# Patient Record
Sex: FEMALE | Race: WHITE | NOT HISPANIC OR LATINO | Employment: OTHER | URBAN - METROPOLITAN AREA
[De-identification: names, ages, dates, MRNs, and addresses within clinical notes are randomized per-mention and may not be internally consistent; named-entity substitution may affect disease eponyms.]

---

## 2023-08-15 ENCOUNTER — EVALUATION (OUTPATIENT)
Dept: PHYSICAL THERAPY | Facility: CLINIC | Age: 53
End: 2023-08-15
Payer: COMMERCIAL

## 2023-08-15 DIAGNOSIS — M72.2 PLANTAR FASCIITIS OF LEFT FOOT: Primary | ICD-10-CM

## 2023-08-15 PROCEDURE — 97161 PT EVAL LOW COMPLEX 20 MIN: CPT | Performed by: PHYSICAL THERAPIST

## 2023-08-15 RX ORDER — ESTRADIOL 0.1 MG/G
2 CREAM VAGINAL DAILY
COMMUNITY

## 2023-08-15 RX ORDER — DUTASTERIDE 0.5 MG/1
0.5 CAPSULE, LIQUID FILLED ORAL DAILY
COMMUNITY

## 2023-08-15 RX ORDER — ESTRADIOL 0.07 MG/D
1 FILM, EXTENDED RELEASE TRANSDERMAL 2 TIMES WEEKLY
COMMUNITY

## 2023-08-15 NOTE — LETTER
2023    Jayy ChocoAndre [facility]    Patient: Trish Curiel   YOB: 1970   Date of Visit: 8/15/2023     Encounter Diagnosis     ICD-10-CM    1. Plantar fasciitis of left foot  M72.2           Dear Dr. Gastelum Mom: Thank you for your recent referral of Preethi Carpenter. Please review the attached evaluation summary from Preethi's recent visit. Please verify that you agree with the plan of care by signing the attached order. If you have any questions or concerns, please do not hesitate to call. I sincerely appreciate the opportunity to share in the care of one of your patients and hope to have another opportunity to work with you in the near future. Sincerely,    Marcia Pedro, PT      Referring Provider:      I certify that I have read the below Plan of Care and certify the need for these services furnished under this plan of treatment while under my care. Jayy ChocoAndre [facility]  Via Fax: [phone]          PT Evaluation     Today's date: 8/15/2023  Patient name: Trish uCriel  : 1970  MRN: [id number]  Referring provider: Jayy Wilhelm DPM  Dx:   Encounter Diagnosis     ICD-10-CM    1. Plantar fasciitis of left foot  M72.2                      Assessment  Assessment details: Patient is a 46 y.o. female who  presents with L foot pain  associated with a diagnosis of plantar fasciitis. She has functional limitations as a result of impairments. Patient would benefit from course of skilled physical therapy to address above listed impairments in an effort to improve function. Understanding of Dx/Px/POC: excellent  Goals  Short Term Goals:  1) Pain : Decrease L foot    pain to 2/10 at worst x 1 continuous week within 2-3 weeks.   2) Function: Improved FOTO score from IE within 2-3 weeks (58@ IE) patient to note greater ease of ambulation within 2-3 weeks    LongTerm Goals:  1) Pain : Elimiminate L foot  pain  x 1 continuous week within 4-6 weeks. 2) Function: Improved FOTO score to at least 70 ; no reported difficulty with ADLs as they pertain to foot within 4-6 weeks. 3) Independent  with HEP within 4-6 weeks. Plan  Patient would benefit from: skilled physical therapy  Planned modality interventions: TENS, thermotherapy: hydrocollator packs, cryotherapy, ultrasound and low level laser therapy (prn)  Planned therapy interventions: home exercise program, stretching, strengthening, manual therapy and joint mobilization  Frequency: 2x/wk x 4-6 weeks. Treatment plan discussed with: patient        Subjective Evaluation    History of Present Illness  Mechanism of injury: Patient is a 46 y.o. old female who presents for an initial outpatient physical therapy consultation regarding her L foot pain. Bilateral foot pain  set in insidiously 2023. Had received steroidal injectons in the past with good relief, most recently on 23. Recent flare of L sided pain , now referred for course of therapy. Pain occurs pervasively from first step out of bed in the AM, tends to increase as with more time spent on feet. Patient Goals  Patient goals for therapy: decreased pain, independence with ADLs/IADLs and return to sport/leisure activities    Pain  At worst pain ratin  Quality: "like fire"  Aggravating factors: standing and walking    Social Support    Working: retired. Objective     Tenderness   Left Ankle/Foot   Tenderness in the plantar fascia. Active Range of Motion   Left Ankle/Foot   Normal active range of motion    Right Ankle/Foot   Normal active range of motion    Joint Play   Left Ankle/Foot  Joints within functional limits are the talocrural joint. Hypomobile in the subtalar joint. Right Ankle/Foot  Joints within functional limits are the talocrural joint and subtalar joint.      Strength/Myotome Testing Left Ankle/Foot   Normal strength    Right Ankle/Foot   Normal strength    Ambulation   Weight-Bearing Status   Weight-Bearing Status (Left): full weight bearing   Weight-Bearing Status (Right): full weight-bearing    Assistive device used: none    Observational Gait   Gait: within functional limits             Precautions: none      Manuals 8/15/23            IASTM L plantar fascia RG            Direct contact low level laser L plantar fascia: plantar fasciitis protocol RG   15W  4 min 37 sec                                      Neuro Re-Ed                                                                                                        Ther Ex                                                                                                                     Ther Activity                                       Gait Training                                       Modalities                          IE/HEP RG

## 2023-08-15 NOTE — PROGRESS NOTES
PT Evaluation     Today's date: 8/15/2023  Patient name: Jose Clifford  : 1970  MRN: [id number]  Referring provider: Gilbert Palacio DPM  Dx:   Encounter Diagnosis     ICD-10-CM    1. Plantar fasciitis of left foot  M72.2                      Assessment  Assessment details: Patient is a 46 y.o. female who  presents with L foot pain  associated with a diagnosis of plantar fasciitis. She has functional limitations as a result of impairments. Patient would benefit from course of skilled physical therapy to address above listed impairments in an effort to improve function. Understanding of Dx/Px/POC: excellent  Goals  Short Term Goals:  1) Pain : Decrease L foot    pain to 2/10 at worst x 1 continuous week within 2-3 weeks. 2) Function: Improved FOTO score from IE within 2-3 weeks (58@ IE) patient to note greater ease of ambulation within 2-3 weeks    LongTerm Goals:  1) Pain : Elimiminate L foot  pain  x 1 continuous week within 4-6 weeks. 2) Function: Improved FOTO score to at least 70 ; no reported difficulty with ADLs as they pertain to foot within 4-6 weeks. 3) Independent  with HEP within 4-6 weeks. Plan  Patient would benefit from: skilled physical therapy  Planned modality interventions: TENS, thermotherapy: hydrocollator packs, cryotherapy, ultrasound and low level laser therapy (prn)  Planned therapy interventions: home exercise program, stretching, strengthening, manual therapy and joint mobilization  Frequency: 2x/wk x 4-6 weeks. Treatment plan discussed with: patient        Subjective Evaluation    History of Present Illness  Mechanism of injury: Patient is a 46 y.o. old female who presents for an initial outpatient physical therapy consultation regarding her L foot pain. Bilateral foot pain  set in insidiously 2023. Had received steroidal injectons in the past with good relief, most recently on 23.      Recent flare of L sided pain , now referred for course of therapy. Pain occurs pervasively from first step out of bed in the AM, tends to increase as with more time spent on feet. Patient Goals  Patient goals for therapy: decreased pain, independence with ADLs/IADLs and return to sport/leisure activities    Pain  At worst pain ratin  Quality: "like fire"  Aggravating factors: standing and walking    Social Support    Working: retired. Objective     Tenderness   Left Ankle/Foot   Tenderness in the plantar fascia. Active Range of Motion   Left Ankle/Foot   Normal active range of motion    Right Ankle/Foot   Normal active range of motion    Joint Play   Left Ankle/Foot  Joints within functional limits are the talocrural joint. Hypomobile in the subtalar joint. Right Ankle/Foot  Joints within functional limits are the talocrural joint and subtalar joint.      Strength/Myotome Testing     Left Ankle/Foot   Normal strength    Right Ankle/Foot   Normal strength    Ambulation   Weight-Bearing Status   Weight-Bearing Status (Left): full weight bearing   Weight-Bearing Status (Right): full weight-bearing    Assistive device used: none    Observational Gait   Gait: within functional limits              Precautions: none      Manuals 8/15/23            IASTM L plantar fascia RG            Direct contact low level laser L plantar fascia: plantar fasciitis protocol RG   15W  4 min 37 sec                                      Neuro Re-Ed                                                                                                        Ther Ex                                                                                                                     Ther Activity                                       Gait Training                                       Modalities                          IE/HEP ABIOLA

## 2023-08-18 ENCOUNTER — OFFICE VISIT (OUTPATIENT)
Dept: PHYSICAL THERAPY | Facility: CLINIC | Age: 53
End: 2023-08-18
Payer: COMMERCIAL

## 2023-08-18 DIAGNOSIS — M72.2 PLANTAR FASCIITIS OF LEFT FOOT: Primary | ICD-10-CM

## 2023-08-18 PROCEDURE — 97140 MANUAL THERAPY 1/> REGIONS: CPT | Performed by: PHYSICAL THERAPIST

## 2023-08-18 NOTE — PROGRESS NOTES
Daily Note     Today's date: 2023  Patient name: Chuckie Hutson  : 1970  MRN: [id number]  Referring provider: Serina Tomas DPM  Dx:   Encounter Diagnosis     ICD-10-CM    1. Plantar fasciitis of left foot  M72.2                      Subjective: Felt short term relief after last visit, reports improved, but continued L heel pain today. Objective: See treatment diary below. Tender to palpation L plantar fascia insertion to calcaneus. Trial of [place] taping to L heel fat pad to make it more pronounced. Assessment: Tolerated treatment well. Restriction improved with use of IASTM. Patient would benefit from continued course of skilled physical therapy to address impairments in an effort to improve function. Plan: Continue per plan of care.       Precautions: none      Manuals 8/15/23 8/18           IASTM L plantar fascia RG RG           Direct contact low level laser L plantar fascia: plantar fasciitis protocol RG   15W  4 min 37 sec RG  15W 4min  37 sec           Lancaster tape to increase fat pad  RG                        Neuro Re-Ed                                                                                                        Ther Ex                                                                                                                     Ther Activity                                       Gait Training                                       Modalities                          IE/HEP RG

## 2023-08-21 ENCOUNTER — OFFICE VISIT (OUTPATIENT)
Dept: PHYSICAL THERAPY | Facility: CLINIC | Age: 53
End: 2023-08-21
Payer: COMMERCIAL

## 2023-08-21 DIAGNOSIS — M72.2 PLANTAR FASCIITIS OF LEFT FOOT: Primary | ICD-10-CM

## 2023-08-21 PROCEDURE — 97140 MANUAL THERAPY 1/> REGIONS: CPT

## 2023-08-21 NOTE — PROGRESS NOTES
Daily Note     Today's date: 2023  Patient name: David Green  : 1970  MRN: [id number]  Referring provider: Sita Falcon DPM  Dx:   Encounter Diagnosis     ICD-10-CM    1. Plantar fasciitis of left foot  M72.2                      Subjective: pt has been consistent with her daily routine, but frustrated that she is not able to do more. Irritation of sx around 5,000 steps, does not exceed 10,000 steps in order to avoid further irritation and pain. Taping lasted for 2 days. Objective: See treatment diary below      Assessment: Tolerated treatment well. Positive response to Lancaster tape. Restriction release noted with IASTM. Progress as able. Plan: Continue per plan of care.        Precautions: none      Manuals 8/15/23 8/18 8/21          IASTM L plantar fascia ABIOLA BALLARD          Direct contact low level laser L plantar fascia: plantar fasciitis protocol RG   15W  4 min 37 sec RG  15W 4min  37 sec RG  15W 4min  37 sec          Lancaster tape to increase fat pad  ABIOLA BALLARD                       Neuro Re-Ed                                                                                                        Ther Ex                                                                                                                     Ther Activity                                       Gait Training                                       Modalities                          IE/HEP ABIOLA

## 2023-08-24 ENCOUNTER — OFFICE VISIT (OUTPATIENT)
Dept: PHYSICAL THERAPY | Facility: CLINIC | Age: 53
End: 2023-08-24
Payer: COMMERCIAL

## 2023-08-24 DIAGNOSIS — M72.2 PLANTAR FASCIITIS OF LEFT FOOT: Primary | ICD-10-CM

## 2023-08-24 PROCEDURE — 97140 MANUAL THERAPY 1/> REGIONS: CPT

## 2023-08-24 NOTE — PROGRESS NOTES
Daily Note     Today's date: 2023  Patient name: Tete Dawn  : 1970  MRN: [id number]  Referring provider: Arianna Gutierrez DPM  Dx:   Encounter Diagnosis     ICD-10-CM    1. Plantar fasciitis of left foot  M72.2                      Subjective: Patient reported that symptoms are no worse but definitely feels there is some improvement. Continues to perform self STM to PF. Objective: See treatment diary below. Assessment: IASTM continues to provide a reduction in PF restrictions with use. Continued with fat pad taping to reduce presence of heel pain through added cushion support for weight bearing. Plan: Continue per plan of care.          Precautions: none    Manuals 8/15/23 8/18 8/21 8/24         IASTM L plantar fascia ABIOLA WHITNEY         Direct contact low level laser L plantar fascia: plantar fasciitis protocol RG   15W  4 min 37 sec RG  15W 4min  37 sec RG  15W 4min  37 sec 15 W-          Lancaster tape to increase fat pad  ABIOLA WHITNEY                      Neuro Re-Ed                                                                                                        Ther Ex                                                                                                                     Ther Activity                                       Gait Training                                       Modalities                          IE/HEP RG

## 2023-08-28 ENCOUNTER — OFFICE VISIT (OUTPATIENT)
Dept: PHYSICAL THERAPY | Facility: CLINIC | Age: 53
End: 2023-08-28
Payer: COMMERCIAL

## 2023-08-28 DIAGNOSIS — M72.2 PLANTAR FASCIITIS OF LEFT FOOT: Primary | ICD-10-CM

## 2023-08-28 PROCEDURE — 97140 MANUAL THERAPY 1/> REGIONS: CPT | Performed by: PHYSICAL THERAPIST

## 2023-08-28 NOTE — PROGRESS NOTES
Daily Note     Today's date: 2023  Patient name: Juliette Ponce  : 1970  MRN: [id number]  Referring provider: Ivan Gibson DPM  Dx:   Encounter Diagnosis     ICD-10-CM    1. Plantar fasciitis of left foot  M72.2           Start Time: 59  Stop Time: 1435  Total time in clinic (min): 30 minutes    Subjective: Not much pain after last visit through Thursday and Friday. Pain returned with increased periods on feet on Saturday and . At 7,400 steps so far today and has been doing a lot of work around and has some pain but intensity not as bad as it would have been immediately before starting therapy. Objective: See treatment diary below  Tender to palpation directly at L plantar fascia insertion to calcaneus. Focused low level laser to this area. Assessment: Tolerated treatment well. Patient is noting some functional improvement, but still with unresolved pain and inflammation at L heel. Patient would benefit from continued course of skilled physical therapy to address impairments in an effort to improve function. Plan: Continue per plan of care.       Precautions: none    Manuals 8/15/23 8/18 8/21 8/24 8/28        IASTM L plantar fascia RG RG MB EH RG        Direct contact low level laser L plantar fascia: plantar fasciitis protocol RG   15W  4 min 37 sec RG  15W 4min  37 sec RG  15W 4min  37 sec 15 W- EH 15W  RG        Lancaster tape to increase fat pad  RG MB EH RG        Joint mobilizations :Post Talar  Subtalar     Greade IV        Rearfoot distraction     Grade V        Neuro Re-Ed                                                                                                        Ther Ex                                                                                                                     Ther Activity                                       Gait Training                                       Modalities                          IE/HEP ABIOLA

## 2023-08-31 ENCOUNTER — OFFICE VISIT (OUTPATIENT)
Dept: PHYSICAL THERAPY | Facility: CLINIC | Age: 53
End: 2023-08-31
Payer: COMMERCIAL

## 2023-08-31 DIAGNOSIS — M72.2 PLANTAR FASCIITIS OF LEFT FOOT: Primary | ICD-10-CM

## 2023-08-31 PROCEDURE — 97140 MANUAL THERAPY 1/> REGIONS: CPT | Performed by: PHYSICAL THERAPIST

## 2023-08-31 NOTE — PROGRESS NOTES
Daily Note     Today's date: 2023  Patient name: Tahira Zee  : 1970  MRN: [id number]  Referring provider: Olga Burgess DPM  Dx:   Encounter Diagnosis     ICD-10-CM    1. Plantar fasciitis of left foot  M72.2                      Subjective: Still noting some improvement. Can spend more time on feet and do more aorund house with less L heel shady as compared to when sraing therapy. Objective: See treatment diary below  Tender to palpation directly at L plantar fascia insertion to calcaneus. Not as tender as at time of last visit. Has restriction at plantar fascia insertion to calcaneus as noted with IASTM. Assessment: Doing better, responding well to current plan of care. . Still with  unresolved pain and inflammation at L heel. Patient would benefit from continued course of skilled physical therapy to address impairments in an effort to improve function. Plan: Continue per plan of care.       Precautions: none    Manuals 8/15/23 8/18 8/21 8/24 8/28 8/31       IASTM L plantar fascia RG RG MB  RG RG       Direct contact low level laser L plantar fascia: plantar fasciitis protocol RG   15W  4 min 37 sec RG  15W 4min  37 sec RG  15W 4min  37 sec 15 W-  15W  RG 15W  RG       Lancaster tape to increase fat pad  ABIOLA WHITNEY RG RG       Joint mobilizations :Post Talar  Subtalar     Greade IV Grade IV       Rearfoot distraction     Grade V Grade V       Neuro Re-Ed                                                                                                        Ther Ex                                                                                                                     Ther Activity                                       Gait Training                                       Modalities                          IE/HEP RG

## 2023-09-05 ENCOUNTER — OFFICE VISIT (OUTPATIENT)
Dept: PHYSICAL THERAPY | Facility: CLINIC | Age: 53
End: 2023-09-05
Payer: COMMERCIAL

## 2023-09-05 DIAGNOSIS — M72.2 PLANTAR FASCIITIS OF LEFT FOOT: Primary | ICD-10-CM

## 2023-09-05 PROCEDURE — 97140 MANUAL THERAPY 1/> REGIONS: CPT | Performed by: PHYSICAL THERAPIST

## 2023-09-05 NOTE — PROGRESS NOTES
Daily Note     Today's date: 2023  Patient name: Ochoa Montemayor  : 1970  MRN: [id number]  Referring provider: Anabela Gamez DPM  Dx: No diagnosis found. Subjective: More soreness the following day after last P.T. session. Pain has since improved, but remains unresolved. Objective: See treatment diary below. Restriction at plantar fascia insertion to calcaneus noted with initiation of  IASTM, this has resolved by the end of the session. Assessment: Tolerated treatment well. Hopeful that resolution of insertional plantar fascia restriction with also lead to resolution of pain. Plan: Continue with physical therapy. She will follow up with Dr. Drew Medina on 23.      Precautions: none    Manuals 8/15/23 8/18 8/21 8/24 8/28 8/31 9/5      IASTM L plantar fascia RG RG MB EH RG RG RG      Direct contact low level laser L plantar fascia: plantar fasciitis protocol RG   15W  4 min 37 sec RG  15W 4min  37 sec RG  15W 4min  37 sec 15 W- EH 15W  RG 15W  RG 15W  RG      Lancaster tape to increase fat pad  RG MB EH RG RG RG      Joint mobilizations :Post Talar  Subtalar     Greade IV Grade IV Grade IV      Rearfoot distraction     Grade V Grade V Grade V      Neuro Re-Ed                                                                                                        Ther Ex                                                                                                                     Ther Activity                                       Gait Training                                       Modalities                          IE/HEP RG

## 2023-09-08 ENCOUNTER — OFFICE VISIT (OUTPATIENT)
Dept: PHYSICAL THERAPY | Facility: CLINIC | Age: 53
End: 2023-09-08
Payer: COMMERCIAL

## 2023-09-08 DIAGNOSIS — M72.2 PLANTAR FASCIITIS OF LEFT FOOT: Primary | ICD-10-CM

## 2023-09-08 PROCEDURE — 97140 MANUAL THERAPY 1/> REGIONS: CPT | Performed by: PHYSICAL THERAPIST

## 2023-09-09 NOTE — PROGRESS NOTES
Daily Note     Today's date: 2023  Patient name: Silvino Carnes  : 1970  MRN: [id number]  Referring provider: Ritesh Oconnell DPM  Dx:   Encounter Diagnosis     ICD-10-CM    1. Plantar fasciitis of left foot  M72.2                      Subjective: No changes sice last visit. Has heel pain with increased periods on feet. Objective: See treatment diary below. Mild restriction noted at L plantar fascia with IASTM. Assessed calf, no restrictions noted, no achilles tenderness. Assessment: Tolerated treatment well. Better objective vs subjective progress. Plan: Continue with physical therapy. She will follow up with Dr. Jaun Acevedo on 23.      Precautions: none    Manuals 8/15/23 8/18 8/21 8/24 8/28 8/31 9/5 9/8     IASTM L plantar fascia RG RG MB EH RG RG RG RG     Direct contact low level laser L plantar fascia: plantar fasciitis protocol RG   15W  4 min 37 sec RG  15W 4min  37 sec RG  15W 4min  37 sec 15 W- EH 15W  RG 15W  RG 15W  RG 15 W  RG     Lancaster tape to increase fat pad  RG MB EH RG RG RG RG     Joint mobilizations :Post Talar  Subtalar     Greade IV Grade IV Grade IV Grade IV     Rearfoot distraction     Grade V Grade V Grade V Grade V     Neuro Re-Ed                                                                                                        Ther Ex                                                                                                                     Ther Activity                                       Gait Training                                       Modalities                          IE/HEP RG

## 2023-09-11 ENCOUNTER — OFFICE VISIT (OUTPATIENT)
Dept: PHYSICAL THERAPY | Facility: CLINIC | Age: 53
End: 2023-09-11
Payer: COMMERCIAL

## 2023-09-11 DIAGNOSIS — M72.2 PLANTAR FASCIITIS OF LEFT FOOT: Primary | ICD-10-CM

## 2023-09-11 PROCEDURE — 97140 MANUAL THERAPY 1/> REGIONS: CPT | Performed by: PHYSICAL THERAPIST

## 2023-09-11 NOTE — PROGRESS NOTES
Daily Note     Today's date: 2023  Patient name: Kalpesh Olivares  : 1970  MRN: [id number]  Referring provider: Harlan Paez DPM  Dx:   Encounter Diagnosis     ICD-10-CM    1. Plantar fasciitis of left foot  M72.2                      Subjective: Increased pain the day after last visit so limited time on feet. Pain has since subsided, but not resolved. Objective: See treatment diary below. Mild restriction noted at L plantar fascia with IASTM. Added in flexor hallucis longus strengthening with theraband to HEP      Assessment: Still with unresolved pain negatively effecting quality of life and function. Plan: Continue with physical therapy. She will follow up with Dr. Racquel Justice on 23.      Precautions: none    Manuals 8/15/23 8/18 8/21 8/24 8/28 8/31 9/5 9/8 9/11    IASTM L plantar fascia RG RG MB EH RG RG RG RG RG    Direct contact low level laser L plantar fascia: plantar fasciitis protocol RG   15W  4 min 37 sec RG  15W 4min  37 sec RG  15W 4min  37 sec 15 W- EH 15W  RG 15W  RG 15W  RG 15 W  RG 13W  RG  5min    Lancaster tape to increase fat pad  RG MB EH RG RG RG RG RG    Joint mobilizations :Post Talar  Subtalar     Greade IV Grade IV Grade IV Grade IV Grade IV    Rearfoot distraction     Grade V Grade V Grade V Grade V Grade V    Neuro Re-Ed                                                                                                        Ther Ex                                                                                                                     Ther Activity                                       Gait Training                                       Modalities                          IE/HEP RG

## 2023-09-15 ENCOUNTER — OFFICE VISIT (OUTPATIENT)
Dept: PHYSICAL THERAPY | Facility: CLINIC | Age: 53
End: 2023-09-15
Payer: COMMERCIAL

## 2023-09-15 DIAGNOSIS — M72.2 PLANTAR FASCIITIS OF LEFT FOOT: Primary | ICD-10-CM

## 2023-09-15 PROCEDURE — 97140 MANUAL THERAPY 1/> REGIONS: CPT | Performed by: PHYSICAL THERAPIST

## 2023-09-15 NOTE — PROGRESS NOTES
Discharge    Today's date: 9/15/2023  Patient name: Valery Mariee  : 1970  MRN: [id number]  Referring provider: Karo Davenport DPM  Dx:   Encounter Diagnosis     ICD-10-CM    1. Plantar fasciitis of left foot  M72.2           Assessment  Assessment details: Patient is a 46 y.o. female who  presented to physical therapy initially on 8/15/23 with L foot pain  associated with a diagnosis of plantar fasciitis. Since starting therapy pt has made the following progress towards goals:  1) Decreased pain  2) Improved self rated functional score (FOTO score improved to 78 from 58 @ IE). Pain improved, but unresolved. Plan: Advised patient to continue with home exercise program . She will follow up with [facility] Kim next week. Advised patient to contact me with any future questions or concerns regarding exercise. Pt is in agreement with discharge plan. Understanding of Dx/Px/POC: excellent  Goals  Short Term Goals:  1) Pain : Decrease L foot    pain to 2/10 at worst x 1 continuous week within 2-3 weeks. -not yet met  2) Function: Improved FOTO score from IE within 2-3 weeks (58@ IE) patient to note greater ease of ambulation within 2-3 weeks-met    LongTerm Goals:  1) Pain : Elimiminate L foot  pain  x 1 continuous week within 4-6 weeks. -not yet met  2) Function: Improved FOTO score to at least 70 ; no reported difficulty with ADLs as they pertain to foot within 4-6 weeks. -partially met  3) Independent  with HEP within 4-6 weeks. -met               Subjective Evaluation     History of Present Illness  Mechanism of injury: Patient is a 46 y.o. old female who presents for an initial outpatient physical therapy consultation regarding her L foot pain. Bilateral foot pain  set in insidiously 2023. Had received steroidal injectons in the past with good relief, most recently on 23. Recent flare of L sided pain , now referred for course of therapy.        Pain occurs pervasively from first step out of bed in the AM, tends to increase as with more time spent on feet. UPDATE 9/15/23: Improved, but unresolved L heel jovanny. Able to spend more time on feet, do more around the house as compared to immediately before starting therapy. Patient Goals : patient has made progress towards goals  Patient goals for therapy: decreased pain, independence with ADLs/IADLs and return to sport/leisure activities        Social Support     Working: retired.         Objective      Tenderness   Left Ankle/Foot   Tenderness in the plantar fascia ar calcaneal insertion. Less as compared to initial evaluation. Less restriction noted with IASTM in plantar fascia as compared to when initiating therapy.     Active Range of Motion   Left Ankle/Foot   Normal active range of motion     Right Ankle/Foot   Normal active range of motion     Joint Play   Left Ankle/Foot  Joints within functional limits are the talocrural joint.  Hypomobile in the subtalar joint.      Right Ankle/Foot  Joints within functional limits are the talocrural joint and subtalar joint.      Strength/Myotome Testing      Left Ankle/Foot   Normal strength     Right Ankle/Foot   Normal strength     Ambulation   Weight-Bearing Status   Weight-Bearing Status (Left): full weight bearing   Weight-Bearing Status (Right): full weight-bearing    Assistive device used: none     Observational Gait   Gait: within functional limits        Start Time: 1351  Stop Time: 1420  Total time in clinic (min): 29 minutes       Precautions: none    Manuals 8/15/23 8/18 8/21 8/24 8/28 8/31 9/5 9/8 9/11 9/15   IASTM L plantar fascia RG RG MB EH RG RG RG RG RG RG   Direct contact low level laser L plantar fascia: plantar fasciitis protocol RG   15W  4 min 37 sec RG  15W 4min  37 sec RG  15W 4min  37 sec 15 W- EH 15W  RG 15W  RG 15W  RG 15 W  RG 13W  RG  5min 14W  4 min 37 sec   Lancaster tape to increase fat pad  RG MB EH RG RG RG RG RG RG   Joint mobilizations :Post Talar  Subtalar     Greade IV Grade IV Grade IV Grade IV Grade IV Grade Iv   Rearfoot distraction     Grade V Grade V Grade V Grade V Grade V Grade V   Neuro Re-Ed                                                                                                        Ther Ex                                                                                                                     Ther Activity                                       Gait Training                                       Modalities                          IE/HEP RG

## 2023-09-15 NOTE — LETTER
September 15, 2023    Jabari Suyapa, [address] [place] [address]    Patient: Drew Ware   YOB: 1970   Date of Visit: 9/15/2023     Encounter Diagnosis     ICD-10-CM    1. Plantar fasciitis of left foot  M72.2           Dear Dr. Cheko Gutierrez: Thank you for your recent referral of Preethi Carpenter. Please review the attached evaluation summary from Preethi's recent visit. Please verify that you agree with the plan of care by signing the attached order. If you have any questions or concerns, please do not hesitate to call. I sincerely appreciate the opportunity to share in the care of one of your patients and hope to have another opportunity to work with you in the near future. Sincerely,    Ramesh Arellano, PT      Referring Provider:      I certify that I have read the below Plan of Care and certify the need for these services furnished under this plan of treatment while under my care. Jabari Dale [address] [place], [place] [id number]  Via Fax: [phone]          Discharge    Today's date: 9/15/2023  Patient name: Drew Ware  : 1970  MRN: [id number]  Referring provider: Jabari Dale DPM  Dx:   Encounter Diagnosis     ICD-10-CM    1. Plantar fasciitis of left foot  M72.2           Assessment  Assessment details: Patient is a 46 y.o. female who  presented to physical therapy initially on 8/15/23 with L foot pain  associated with a diagnosis of plantar fasciitis. Since starting therapy pt has made the following progress towards goals:  1) Decreased pain  2) Improved self rated functional score (FOTO score improved to 78 from 58 @ IE). Pain improved, but unresolved. Plan: Advised patient to continue with home exercise program . She will follow up with Cheko Kim next week. Advised patient to contact me with any future questions or concerns regarding exercise.  Pt is in agreement with discharge plan. Understanding of Dx/Px/POC: excellent  Goals  Short Term Goals:  1) Pain : Decrease L foot    pain to 2/10 at worst x 1 continuous week within 2-3 weeks. -not yet met  2) Function: Improved FOTO score from IE within 2-3 weeks (58@ IE) patient to note greater ease of ambulation within 2-3 weeks-met    LongTerm Goals:  1) Pain : Elimiminate L foot  pain  x 1 continuous week within 4-6 weeks. -not yet met  2) Function: Improved FOTO score to at least 70 ; no reported difficulty with ADLs as they pertain to foot within 4-6 weeks. -partially met  3) Independent  with HEP within 4-6 weeks. -met               Subjective Evaluation     History of Present Illness  Mechanism of injury: Patient is a 46 y.o. old female who presents for an initial outpatient physical therapy consultation regarding her L foot pain. Bilateral foot pain  set in insidiously Jan 2023. Had received steroidal injectons in the past with good relief, most recently on 6/12/23. Recent flare of L sided pain , now referred for course of therapy. Pain occurs pervasively from first step out of bed in the AM, tends to increase as with more time spent on feet. UPDATE 9/15/23: Improved, but unresolved L heel jovanny. Able to spend more time on feet, do more around the house as compared to immediately before starting therapy. Patient Goals : patient has made progress towards goals  Patient goals for therapy: decreased pain, independence with ADLs/IADLs and return to sport/leisure activities        Social Support     Working: retired.         Objective      Tenderness   Left Ankle/Foot   Tenderness in the plantar fascia ar calcaneal insertion. Less as compared to initial evaluation.   Less restriction noted with IASTM in plantar fascia as compared to when initiating therapy.     Active Range of Motion   Left Ankle/Foot   Normal active range of motion     Right Ankle/Foot   Normal active range of motion     Joint Play   Left Ankle/Foot  Joints within functional limits are the talocrural joint.  Hypomobile in the subtalar joint.      Right Ankle/Foot  Joints within functional limits are the talocrural joint and subtalar joint.      Strength/Myotome Testing      Left Ankle/Foot   Normal strength     Right Ankle/Foot   Normal strength     Ambulation   Weight-Bearing Status   Weight-Bearing Status (Left): full weight bearing   Weight-Bearing Status (Right): full weight-bearing    Assistive device used: none     Observational Gait   Gait: within functional limits        Start Time: 1351  Stop Time: 1420  Total time in clinic (min): 29 minutes      Precautions: none    Manuals 8/15/23 8/18 8/21 8/24 8/28 8/31 9/5 9/8 9/11 9/15   IASTM L plantar fascia RG RG MB EH RG RG RG RG RG RG   Direct contact low level laser L plantar fascia: plantar fasciitis protocol RG   15W  4 min 37 sec RG  15W 4min  37 sec RG  15W 4min  37 sec 15 W- EH 15W  RG 15W  RG 15W  RG 15 W  RG 13W  RG  5min 14W  4 min 37 sec   Lancaster tape to increase fat pad  RG MB EH RG RG RG RG RG RG   Joint mobilizations :Post Talar  Subtalar     Greade IV Grade IV Grade IV Grade IV Grade IV Grade Iv   Rearfoot distraction     Grade V Grade V Grade V Grade V Grade V Grade V   Neuro Re-Ed                                                                                                        Ther Ex                                                                                                                     Ther Activity                                       Gait Training                                       Modalities                          IE/HEP RG

## 2023-09-22 ENCOUNTER — APPOINTMENT (OUTPATIENT)
Dept: PHYSICAL THERAPY | Facility: CLINIC | Age: 53
End: 2023-09-22
Payer: COMMERCIAL